# Patient Record
Sex: MALE | URBAN - METROPOLITAN AREA
[De-identification: names, ages, dates, MRNs, and addresses within clinical notes are randomized per-mention and may not be internally consistent; named-entity substitution may affect disease eponyms.]

---

## 2023-10-17 ENCOUNTER — TELEPHONE (OUTPATIENT)
Dept: PULMONOLOGY | Facility: CLINIC | Age: 53
End: 2023-10-17

## 2024-04-17 ENCOUNTER — CLINICAL SUPPORT (OUTPATIENT)
Dept: PULMONOLOGY | Facility: CLINIC | Age: 54
End: 2024-04-17
Payer: COMMERCIAL

## 2024-04-17 ENCOUNTER — OFFICE VISIT (OUTPATIENT)
Dept: PULMONOLOGY | Facility: CLINIC | Age: 54
End: 2024-04-17
Payer: COMMERCIAL

## 2024-04-17 ENCOUNTER — HOSPITAL ENCOUNTER (OUTPATIENT)
Dept: RADIOLOGY | Facility: CLINIC | Age: 54
Discharge: HOME | End: 2024-04-17
Attending: INTERNAL MEDICINE
Payer: COMMERCIAL

## 2024-04-17 VITALS
DIASTOLIC BLOOD PRESSURE: 80 MMHG | WEIGHT: 232.63 LBS | RESPIRATION RATE: 18 BRPM | HEART RATE: 59 BPM | BODY MASS INDEX: 37.39 KG/M2 | OXYGEN SATURATION: 94 % | SYSTOLIC BLOOD PRESSURE: 123 MMHG | HEIGHT: 66 IN

## 2024-04-17 DIAGNOSIS — R06.02 SOB (SHORTNESS OF BREATH): ICD-10-CM

## 2024-04-17 DIAGNOSIS — R06.83 LOUD SNORING: ICD-10-CM

## 2024-04-17 DIAGNOSIS — Z72.0 TOBACCO ABUSE: ICD-10-CM

## 2024-04-17 DIAGNOSIS — G47.33 OSA (OBSTRUCTIVE SLEEP APNEA): Primary | ICD-10-CM

## 2024-04-17 DIAGNOSIS — R06.81 WITNESSED APNEIC SPELLS: ICD-10-CM

## 2024-04-17 DIAGNOSIS — G47.19 EXCESSIVE DAYTIME SLEEPINESS: ICD-10-CM

## 2024-04-17 DIAGNOSIS — J44.9 CHRONIC OBSTRUCTIVE PULMONARY DISEASE, UNSPECIFIED COPD TYPE: ICD-10-CM

## 2024-04-17 DIAGNOSIS — Z71.6 TOBACCO ABUSE COUNSELING: ICD-10-CM

## 2024-04-17 PROCEDURE — 71046 X-RAY EXAM CHEST 2 VIEWS: CPT | Mod: 26,,, | Performed by: RADIOLOGY

## 2024-04-17 PROCEDURE — 1159F MED LIST DOCD IN RCRD: CPT | Mod: CPTII,S$GLB,, | Performed by: INTERNAL MEDICINE

## 2024-04-17 PROCEDURE — 94729 DIFFUSING CAPACITY: CPT | Mod: S$GLB,,, | Performed by: INTERNAL MEDICINE

## 2024-04-17 PROCEDURE — 3079F DIAST BP 80-89 MM HG: CPT | Mod: CPTII,S$GLB,, | Performed by: INTERNAL MEDICINE

## 2024-04-17 PROCEDURE — 71046 X-RAY EXAM CHEST 2 VIEWS: CPT | Mod: TC,,, | Performed by: INTERNAL MEDICINE

## 2024-04-17 PROCEDURE — 94060 EVALUATION OF WHEEZING: CPT | Mod: S$GLB,,, | Performed by: INTERNAL MEDICINE

## 2024-04-17 PROCEDURE — 3008F BODY MASS INDEX DOCD: CPT | Mod: CPTII,S$GLB,, | Performed by: INTERNAL MEDICINE

## 2024-04-17 PROCEDURE — 99205 OFFICE O/P NEW HI 60 MIN: CPT | Mod: 25,S$GLB,, | Performed by: INTERNAL MEDICINE

## 2024-04-17 PROCEDURE — 94726 PLETHYSMOGRAPHY LUNG VOLUMES: CPT | Mod: S$GLB,,, | Performed by: INTERNAL MEDICINE

## 2024-04-17 PROCEDURE — 3074F SYST BP LT 130 MM HG: CPT | Mod: CPTII,S$GLB,, | Performed by: INTERNAL MEDICINE

## 2024-04-17 PROCEDURE — 1160F RVW MEDS BY RX/DR IN RCRD: CPT | Mod: CPTII,S$GLB,, | Performed by: INTERNAL MEDICINE

## 2024-04-17 RX ORDER — ALBUTEROL SULFATE 90 UG/1
2 AEROSOL, METERED RESPIRATORY (INHALATION) EVERY 6 HOURS PRN
Qty: 18 G | Refills: 0 | Status: SHIPPED | OUTPATIENT
Start: 2024-04-17 | End: 2025-04-17

## 2024-04-17 RX ORDER — ROPINIROLE 0.5 MG/1
0.5 TABLET, FILM COATED ORAL NIGHTLY
COMMUNITY

## 2024-04-17 RX ORDER — TIOTROPIUM BROMIDE AND OLODATEROL 3.124; 2.736 UG/1; UG/1
2 SPRAY, METERED RESPIRATORY (INHALATION) DAILY
Qty: 0.0002 G | Refills: 5 | Status: SHIPPED | OUTPATIENT
Start: 2024-04-17

## 2024-04-17 RX ORDER — SIMVASTATIN 20 MG/1
20 TABLET, FILM COATED ORAL NIGHTLY
COMMUNITY

## 2024-04-17 RX ORDER — FAMOTIDINE 40 MG/1
40 TABLET, FILM COATED ORAL DAILY
COMMUNITY

## 2024-04-17 RX ORDER — BUPROPION HYDROCHLORIDE 150 MG/1
150 TABLET, EXTENDED RELEASE ORAL 2 TIMES DAILY
COMMUNITY

## 2024-04-17 RX ORDER — BUPROPION HYDROCHLORIDE 100 MG/1
100 TABLET ORAL 2 TIMES DAILY
COMMUNITY

## 2024-04-17 RX ORDER — PANTOPRAZOLE SODIUM 20 MG/1
20 TABLET, DELAYED RELEASE ORAL DAILY
COMMUNITY

## 2024-04-17 NOTE — PROGRESS NOTES
Pulmonary Medicine Clinic Note    Patient Identification:   Patient ID: Wie Hall is a 53 y.o. male.    Referring Provider:  [facility]    Chief Complaint:  ARON    History of Present Illness:    Wei Hall is a 53 y.o. male who has been referred to me for the evaluation and management of sleep disordered breathing.  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  Patient's BMI is 37.5 kilos per m2.  This varies between 230-250 lb based upon the job he is doing.  He has mild exertional dyspnea he can climb up to 4 flights of stairs.  He has no trouble carrying his groceries from grocery store all the way to his car.  He has been smoking for 25 years mostly pack a day, currently down to 3-5 cigarettes a day.  He does have an albuterol inhaler that he uses about 4 times a week and it seems to help.  No history of asthma.  Denies any cough but occasional wheezing.  He was diagnosed with obstructive sleep apnea more than 20 years ago and has been on a CPAP which is currently broke.  He continues to get some supplies online but it looks like the pressures are not enough and the machine is not working properly.  His [place] Sleepiness scale score is 16.  He has been told that he snores and still has witnessed apneas.  His neck circumference is 18 inches with an elevated stop Bang score.  His Mallampati class is 3.    Review of Systems:  Review of Systems   Constitutional:  Negative for fever, chills, weight loss, weight gain, activity change, appetite change, fatigue, night sweats and weakness.   HENT:  Negative for nosebleeds, postnasal drip, rhinorrhea, sinus pressure, sore throat, trouble swallowing, voice change, congestion, ear pain and hearing loss.    Eyes:  Negative for redness and itching.   Respiratory:  Positive for apnea, snoring, wheezing, dyspnea on extertion  and somnolence. Negative for cough, hemoptysis, sputum production, choking, chest tightness, shortness of breath, orthopnea, previous hospitialization due to pulmonary problems, asthma nighttime symptoms, pleurisy, use of rescue inhaler and Paroxysmal Nocturnal Dyspnea.    Cardiovascular:  Negative for chest pain, palpitations and leg swelling.   Genitourinary:  Negative for difficulty urinating and hematuria.   Endocrine:  Negative for polydipsia, polyphagia, cold intolerance, heat intolerance and polyuria.    Musculoskeletal:  Negative for arthralgias, back pain, gait problem, joint swelling and myalgias.   Skin:  Negative for rash.   Gastrointestinal:  Negative for nausea, vomiting, abdominal pain, abdominal distention and acid reflux.   Neurological:  Negative for dizziness, syncope, weakness, light-headedness and headaches.   Hematological:  Negative for adenopathy. Does not bruise/bleed easily and no excessive bruising.   Psychiatric/Behavioral:  Negative for confusion and sleep disturbance. The patient is not nervous/anxious.          Allergies: Review of patient's allergies indicates:  No Known Allergies    Medications:      Past Medical History:      Past Medical History:   Diagnosis Date    Anxiety disorder, unspecified     GERD (gastroesophageal reflux disease)     Mixed hyperlipidemia     Restless leg syndrome        Family History:      Family History   Problem Relation Name Age of Onset    MAGGY disease Mother      Diabetes Father      Coronary artery disease Father      Brain aneurysm Father          Social History:      Past Surgical History:   Procedure Laterality Date    APPENDECTOMY      HERNIA REPAIR      LIPOMA RESECTION Left        Physical Exam:  Vitals:    04/17/24 1203   BP: 123/80   Pulse: (!) 59   Resp: 18     Physical Exam   Constitutional: He is oriented to person, place, and time. He appears not cachectic. No distress. He is obese.   HENT:   Head: Normocephalic.   Right Ear: External ear  normal.   Left Ear: External ear normal.   Nose: Nose normal. No mucosal edema. No polyps.   Mouth/Throat: Oropharynx is clear and moist. Normal dentition. No oropharyngeal exudate. Mallampati Score: III.   Neck: No JVD present. No tracheal deviation present. No thyromegaly present.   Cardiovascular: Normal rate, regular rhythm, normal heart sounds and intact distal pulses. Exam reveals no gallop and no friction rub.   No murmur heard.  Pulmonary/Chest: Normal expansion, symmetric chest wall expansion, effort normal and breath sounds normal. No stridor. No respiratory distress. He has no decreased breath sounds. He has no wheezes. He has no rhonchi. He has no rales. Chest wall is not dull to percussion. He exhibits no tenderness. Negative for egophony. Negative for tactile fremitus.   Abdominal: Soft. Bowel sounds are normal. He exhibits no distension and no mass. There is no hepatosplenomegaly. There is no abdominal tenderness. There is no rebound and no guarding. No hernia.   Musculoskeletal:         General: No tenderness, deformity or edema. Normal range of motion.      Cervical back: Normal range of motion and neck supple.   Lymphadenopathy: No supraclavicular adenopathy is present.     He has no cervical adenopathy.     He has no axillary adenopathy.   Neurological: He is alert and oriented to person, place, and time. He has normal reflexes. He displays normal reflexes. No cranial nerve deficit. He exhibits normal muscle tone.   Skin: Skin is warm and dry. No rash noted. He is not diaphoretic. No cyanosis or erythema. No pallor. Nails show no clubbing.   Psychiatric: He has a normal mood and affect. His behavior is normal. Judgment and thought content normal.         Accessory Clinical Data:  Previous Sleep Study findings:  Not available    ESS of 16    STOPBANG of 7    PFTs and chest x-ray are not available.    Do you snore loudly?  Do you feel tired, fatigued or sleepy during the day?  Has anyone observed  that you stop breathing in your sleep?  Do you have or been treated for high blood pressure?  BMI:  Age > 50?  Neck circumference > 40 cm:  Gender:     Assessment and Plan:    Problem List Items Addressed This Visit    None  Visit Diagnoses       ARON (obstructive sleep apnea)    -  Primary    SOB (shortness of breath)        Relevant Orders    Complete PFT with bronchodilator    X-Ray Chest PA And Lateral    Excessive daytime sleepiness        Witnessed apneic spells        Tobacco abuse        Tobacco abuse counseling        Loud snoring               Unfortunately I do not have enough records either from patient's primary care provider or from kidney clinic.  All I have been sent with is bunch of kidney related lab workup.    I do not have spirometry results from his workplace either.      Obtain full PFTs and a two-view chest x-ray to determine etiology for his exertional dyspnea and abnormal spirometry.  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  He has been smoking for almost 25 years now.  Further management will depend upon the results of above-mentioned tests.    Patient is very symptomatic from sleep apnea standpoint and is current machine seems to be inadequate for his sleep apnea.  His [place] Sleepiness scale score is 16.  A home sleep study has already been ordered and the process of scheduling is ongoing.  I have discussed with him the pathophysiology of ARON and the harmful cardiovascular and neurocognitive side effects,   morbidity and mortality associated with untreated sleep disordered breathing.  We discussed the available diagnostic and treatment modalities, their merits and demerits in detail.  Losing weight may help with decreasing the severity of sleep disordered breathing and also to some extent his shortness of breath.  Tobacco cessation was advised.  He is using Wellbutrin currently  We will follow-up on home sleep study results and  guide treatment.  All the questions were answered. He is agreeable to the above plan.     More than 50% of 60 min time was spent face-to-face on counseling, in reviewing reviewing labs from primary referral, performing appropriate examination, counseling and educating the patient regarding the findings on exam, obtaining and reviewing CXR and PFTs, ordering appropriate studies, documenting clinical information in the electronic medical record, care coordination as well as communicating with the primary referring physician.      Follow up for after sleep study.  Thank you very much for involving me in the care of this patient.  Please do not hesitate to reach me if you have any further questions or concerns.    Addendum:  PFTs are consistent with mild-moderate obstruction.  Chest x-ray does not show any acute abnormality.  Patient will benefit from Stiolto as maintenance and Albuterol as rescue inhaler.

## 2024-05-28 ENCOUNTER — OUTSIDE PLACE OF SERVICE (OUTPATIENT)
Dept: PULMONOLOGY | Facility: CLINIC | Age: 54
End: 2024-05-28
Payer: COMMERCIAL

## 2024-06-03 PROCEDURE — 95800 SLP STDY UNATTENDED: CPT | Mod: 26,,, | Performed by: INTERNAL MEDICINE
